# Patient Record
Sex: FEMALE | Race: OTHER | Employment: FULL TIME | URBAN - METROPOLITAN AREA
[De-identification: names, ages, dates, MRNs, and addresses within clinical notes are randomized per-mention and may not be internally consistent; named-entity substitution may affect disease eponyms.]

---

## 2022-06-03 ENCOUNTER — OFFICE VISIT (OUTPATIENT)
Dept: ORTHOPEDIC SURGERY | Age: 57
End: 2022-06-03
Payer: COMMERCIAL

## 2022-06-03 VITALS — HEIGHT: 65 IN | WEIGHT: 149 LBS | BODY MASS INDEX: 24.83 KG/M2

## 2022-06-03 DIAGNOSIS — G89.29 CHRONIC PAIN OF BOTH KNEES: Primary | ICD-10-CM

## 2022-06-03 DIAGNOSIS — M25.562 CHRONIC PAIN OF BOTH KNEES: Primary | ICD-10-CM

## 2022-06-03 DIAGNOSIS — M25.561 CHRONIC PAIN OF BOTH KNEES: Primary | ICD-10-CM

## 2022-06-03 DIAGNOSIS — M17.0 BILATERAL PRIMARY OSTEOARTHRITIS OF KNEE: ICD-10-CM

## 2022-06-03 PROCEDURE — 99213 OFFICE O/P EST LOW 20 MIN: CPT | Performed by: ORTHOPAEDIC SURGERY

## 2022-06-03 PROCEDURE — 20610 DRAIN/INJ JOINT/BURSA W/O US: CPT | Performed by: ORTHOPAEDIC SURGERY

## 2022-06-03 RX ORDER — CHOLECALCIFEROL (VITAMIN D3) 125 MCG
CAPSULE ORAL
COMMUNITY
Start: 2022-04-13

## 2022-06-03 RX ORDER — LIDOCAINE HYDROCHLORIDE 10 MG/ML
2 INJECTION INFILTRATION; PERINEURAL ONCE
Status: COMPLETED | OUTPATIENT
Start: 2022-06-03 | End: 2022-06-03

## 2022-06-03 RX ORDER — TRIAMCINOLONE ACETONIDE 40 MG/ML
40 INJECTION, SUSPENSION INTRA-ARTICULAR; INTRAMUSCULAR ONCE
Status: COMPLETED | OUTPATIENT
Start: 2022-06-03 | End: 2022-06-03

## 2022-06-03 RX ORDER — LORATADINE 10 MG/1
10 TABLET ORAL
COMMUNITY

## 2022-06-03 RX ORDER — AMMONIUM LACTATE 12 G/100G
LOTION TOPICAL
COMMUNITY
Start: 2022-05-19

## 2022-06-03 RX ORDER — BISMUTH SUBSALICYLATE 262 MG
1 TABLET,CHEWABLE ORAL DAILY
COMMUNITY

## 2022-06-03 RX ADMIN — TRIAMCINOLONE ACETONIDE 40 MG: 40 INJECTION, SUSPENSION INTRA-ARTICULAR; INTRAMUSCULAR at 16:53

## 2022-06-03 RX ADMIN — LIDOCAINE HYDROCHLORIDE 2 ML: 10 INJECTION INFILTRATION; PERINEURAL at 16:53

## 2022-06-03 NOTE — PROGRESS NOTES
Lindsey Hatch (: 1965) is a 60 y.o. female, patient, here for evaluation of the following chief complaint(s):  Knee Pain (bilateral knee discomfort, does not report any injury or fall, seen in [facility] for knee pain was recommended PT patient did not complete)       HPI:    Chief complaint bilateral knee pain. Longstanding history of knee pain. Discussed possible treatment options patient states that she is at this point not severely involved enough to consider surgery. No Known Allergies    Current Outpatient Medications   Medication Sig    cholecalciferol (VITAMIN D3) (5000 Units /125 mcg) capsule     ammonium lactate (LAC-HYDRIN) 12 % lotion     loratadine (Claritin) 10 mg tablet Take 10 mg by mouth daily as needed for Allergies.  multivitamin (ONE A DAY) tablet Take 1 Tablet by mouth daily.  cyanocobalamin, vitamin B-12, (VITAMIN B12 PO) Take  by mouth daily.  glucosamine sulfate (GLUCOSAMINE PO) Take  by mouth. No current facility-administered medications for this visit. History reviewed. No pertinent past medical history.      Past Surgical History:   Procedure Laterality Date    HX APPENDECTOMY      HX  SECTION         Family History   Problem Relation Age of Onset    Diabetes Mother     Hypertension Mother     Hypertension Father     Cancer Paternal Grandmother     Cancer Paternal Grandfather         Social History     Socioeconomic History    Marital status:      Spouse name: Not on file    Number of children: Not on file    Years of education: Not on file    Highest education level: Not on file   Occupational History    Not on file   Tobacco Use    Smoking status: Never Smoker    Smokeless tobacco: Never Used   Vaping Use    Vaping Use: Not on file   Substance and Sexual Activity    Alcohol use: Never    Drug use: Never    Sexual activity: Not on file   Other Topics Concern    Not on file   Social History Narrative    Not on file Social Determinants of Health     Financial Resource Strain:     Difficulty of Paying Living Expenses: Not on file   Food Insecurity:     Worried About Running Out of Food in the Last Year: Not on file    Radha of Food in the Last Year: Not on file   Transportation Needs:     Lack of Transportation (Medical): Not on file    Lack of Transportation (Non-Medical): Not on file   Physical Activity:     Days of Exercise per Week: Not on file    Minutes of Exercise per Session: Not on file   Stress:     Feeling of Stress : Not on file   Social Connections:     Frequency of Communication with Friends and Family: Not on file    Frequency of Social Gatherings with Friends and Family: Not on file    Attends [employer] Services: Not on file    Active Member of [address] [place] [address] [employer] or Organizations: Not on file    Attends Club or Organization Meetings: Not on file    Marital Status: Not on file   Intimate Partner Violence:     Fear of Current or Ex-Partner: Not on file    Emotionally Abused: Not on file    Physically Abused: Not on file    Sexually Abused: Not on file   Housing Stability:     Unable to Pay for Housing in the Last Year: Not on file    Number of [place] in the Last Year: Not on file    Unstable Housing in the Last Year: Not on file       ROS     Positive for: Musculoskeletal    Last edited by Robles Cuadra on 6/3/2022  2:47 PM. (History)            Vitals:  Ht 5' 5\" (1.651 m)   Wt 149 lb (67.6 kg)   BMI 24.79 kg/m²    Body mass index is 24.79 kg/m². PHYSICAL EXAM:  Bilateral knees are examined. Hips have painless range of motion. Both knees have mild effusions with medial joint line tenderness. Significant patellofemoral crepitation both knees. Right knee effusion is larger than the left. Marcy's testing is negative. IMAGING:  XR Results (most recent):  Results from Appointment encounter on 06/03/22    XR KNEES BI MIN 4 V    Narrative  Bilateral knee x-rays.   4 views in total. Right knee bone-on-bone medial compartment with calcified Baker's cyst and significant patellofemoral joint lateral facet narrowing. Left knee bone-on-bone patellofemoral joint with calcified Baker's cyst.  No acute issues are noted. ASSESSMENT/PLAN:  1. Chronic pain of both knees  -     XR KNEES BI MIN 4 V; Future  -     triamcinolone acetonide (KENALOG-40) 40 mg/mL injection 40 mg; 40 mg, Intra artICUlar, ONCE, 1 dose, On Fri 6/3/22 at 1700  -     lidocaine (XYLOCAINE) 10 mg/mL (1 %) injection 2 mL; 2 mL, Intra artICUlar, ONCE, 1 dose, On Fri 6/3/22 at 1700  -     triamcinolone acetonide (KENALOG-40) 40 mg/mL injection 40 mg; 40 mg, Intra artICUlar, ONCE, 1 dose, On Fri 6/3/22 at 1700  -     lidocaine (XYLOCAINE) 10 mg/mL (1 %) injection 2 mL; 2 mL, Intra artICUlar, ONCE, 1 dose, On Fri 6/3/22 at 1700  2. Bilateral primary osteoarthritis of knee  -     REFERRAL TO PHYSICAL THERAPY  -     triamcinolone acetonide (KENALOG-40) 40 mg/mL injection 40 mg; 40 mg, Intra artICUlar, ONCE, 1 dose, On Fri 6/3/22 at 1700  -     lidocaine (XYLOCAINE) 10 mg/mL (1 %) injection 2 mL; 2 mL, Intra artICUlar, ONCE, 1 dose, On Fri 6/3/22 at 1700  -     triamcinolone acetonide (KENALOG-40) 40 mg/mL injection 40 mg; 40 mg, Intra artICUlar, ONCE, 1 dose, On Fri 6/3/22 at 1700  -     lidocaine (XYLOCAINE) 10 mg/mL (1 %) injection 2 mL; 2 mL, Intra artICUlar, ONCE, 1 dose, On Fri 6/3/22 at 1700    Discussed risks/benefits of cortisone injection and patient gave verbal consent. Under sterile conditions, the bilateral knees were injected with  2cc 1% Lidocaine and 1 cc 40 mg/cc Kenalog intra-articularly, tolerated the procedure well. Therapy was prescribed. Continue with NSAIDs. Follow-up as her symptoms progress. An electronic signature was used to authenticate this note.   --Paula Torre MD

## 2022-10-06 ENCOUNTER — OFFICE VISIT (OUTPATIENT)
Dept: ORTHOPEDIC SURGERY | Age: 57
End: 2022-10-06
Payer: COMMERCIAL

## 2022-10-06 VITALS — BODY MASS INDEX: 24.83 KG/M2 | HEIGHT: 65 IN | WEIGHT: 149 LBS

## 2022-10-06 DIAGNOSIS — M25.461 EFFUSION OF RIGHT KNEE: ICD-10-CM

## 2022-10-06 DIAGNOSIS — M25.462 EFFUSION, LEFT KNEE: Primary | ICD-10-CM

## 2022-10-06 DIAGNOSIS — M17.0 BILATERAL PRIMARY OSTEOARTHRITIS OF KNEE: ICD-10-CM

## 2022-10-06 PROCEDURE — 20610 DRAIN/INJ JOINT/BURSA W/O US: CPT | Performed by: ORTHOPAEDIC SURGERY

## 2022-10-06 RX ORDER — LIDOCAINE HYDROCHLORIDE 10 MG/ML
2 INJECTION INFILTRATION; PERINEURAL ONCE
Status: COMPLETED | OUTPATIENT
Start: 2022-10-06 | End: 2022-10-06

## 2022-10-06 RX ORDER — TRIAMCINOLONE ACETONIDE 40 MG/ML
40 INJECTION, SUSPENSION INTRA-ARTICULAR; INTRAMUSCULAR ONCE
Status: COMPLETED | OUTPATIENT
Start: 2022-10-06 | End: 2022-10-06

## 2022-10-06 RX ADMIN — LIDOCAINE HYDROCHLORIDE 2 ML: 10 INJECTION INFILTRATION; PERINEURAL at 16:31

## 2022-10-06 RX ADMIN — TRIAMCINOLONE ACETONIDE 40 MG: 40 INJECTION, SUSPENSION INTRA-ARTICULAR; INTRAMUSCULAR at 16:32

## 2022-10-06 RX ADMIN — TRIAMCINOLONE ACETONIDE 40 MG: 40 INJECTION, SUSPENSION INTRA-ARTICULAR; INTRAMUSCULAR at 16:31

## 2022-10-06 NOTE — PROGRESS NOTES
Oz Gautam (: 1965) is a 59 y.o. female, patient, here for evaluation of the following chief complaint(s):  Knee Pain (Bilateral knee pain - last injected with cortisone in 2022)       HPI:    Patient has severe patellofemoral joint osteoarthritis both knees. The right knee is bone-on-bone medial compartment from prior x-rays. She was getting along quite well. Last injected in . She is now very painful and swollen. No injury. No other joints are bothering her. No Known Allergies    Current Outpatient Medications   Medication Sig    cholecalciferol (VITAMIN D3) (5000 Units /125 mcg) capsule     ammonium lactate (LAC-HYDRIN) 12 % lotion     loratadine (Claritin) 10 mg tablet Take 10 mg by mouth daily as needed for Allergies. multivitamin (ONE A DAY) tablet Take 1 Tablet by mouth daily. cyanocobalamin, vitamin B-12, (VITAMIN B12 PO) Take  by mouth daily. glucosamine sulfate (GLUCOSAMINE PO) Take  by mouth. No current facility-administered medications for this visit. History reviewed. No pertinent past medical history.      Past Surgical History:   Procedure Laterality Date    HX APPENDECTOMY      HX  SECTION         Family History   Problem Relation Age of Onset    Diabetes Mother     Hypertension Mother     Hypertension Father     Cancer Paternal Grandmother     Cancer Paternal Grandfather         Social History     Socioeconomic History    Marital status:      Spouse name: Not on file    Number of children: Not on file    Years of education: Not on file    Highest education level: Not on file   Occupational History    Not on file   Tobacco Use    Smoking status: Never    Smokeless tobacco: Never   Vaping Use    Vaping Use: Not on file   Substance and Sexual Activity    Alcohol use: Never    Drug use: Never    Sexual activity: Not on file   Other Topics Concern    Not on file   Social History Narrative    Not on file     Social Determinants of Health Financial Resource Strain: Not on file   Food Insecurity: Not on file   Transportation Needs: Not on file   Physical Activity: Not on file   Stress: Not on file   Social Connections: Not on file   Intimate Partner Violence: Not on file   Housing Stability: Not on file       ROS    Positive for: Musculoskeletal  Last edited by Justen Garcia on 10/6/2022  4:04 PM.            Vitals:  Ht 5' 5\" (1.651 m)   Wt 149 lb (67.6 kg)   BMI 24.79 kg/m²    Body mass index is 24.79 kg/m². PHYSICAL EXAM:  Bilateral knees show neutral to slight varus overall alignment. Right knee large effusion. Left knee no effusion. Hips are painless. No other joints seem to bother her. ASSESSMENT/PLAN:  1. Effusion, left knee  -     triamcinolone acetonide (KENALOG-40) 40 mg/mL injection 40 mg; 40 mg, Intra artICUlar, ONCE, 1 dose, On Thu 10/6/22 at 1700  -     lidocaine (XYLOCAINE) 10 mg/mL (1 %) injection 2 mL; 2 mL, Intra artICUlar, ONCE, 1 dose, On Thu 10/6/22 at 1700  -     triamcinolone acetonide (KENALOG-40) 40 mg/mL injection 40 mg; 40 mg, Intra artICUlar, ONCE, 1 dose, On Thu 10/6/22 at 1700  -     lidocaine (XYLOCAINE) 10 mg/mL (1 %) injection 2 mL; 2 mL, Intra artICUlar, ONCE, 1 dose, On Thu 10/6/22 at 1700  2. Effusion of right knee  -     triamcinolone acetonide (KENALOG-40) 40 mg/mL injection 40 mg; 40 mg, Intra artICUlar, ONCE, 1 dose, On Thu 10/6/22 at 1700  -     lidocaine (XYLOCAINE) 10 mg/mL (1 %) injection 2 mL; 2 mL, Intra artICUlar, ONCE, 1 dose, On Thu 10/6/22 at 1700  -     triamcinolone acetonide (KENALOG-40) 40 mg/mL injection 40 mg; 40 mg, Intra artICUlar, ONCE, 1 dose, On Thu 10/6/22 at 1700  -     lidocaine (XYLOCAINE) 10 mg/mL (1 %) injection 2 mL; 2 mL, Intra artICUlar, ONCE, 1 dose, On Thu 10/6/22 at 1700  3.  Bilateral primary osteoarthritis of knee  -     triamcinolone acetonide (KENALOG-40) 40 mg/mL injection 40 mg; 40 mg, Intra artICUlar, ONCE, 1 dose, On Thu 10/6/22 at 1700  -     lidocaine (XYLOCAINE) 10 mg/mL (1 %) injection 2 mL; 2 mL, Intra artICUlar, ONCE, 1 dose, On Thu 10/6/22 at 1700  -     triamcinolone acetonide (KENALOG-40) 40 mg/mL injection 40 mg; 40 mg, Intra artICUlar, ONCE, 1 dose, On Thu 10/6/22 at 1700  -     lidocaine (XYLOCAINE) 10 mg/mL (1 %) injection 2 mL; 2 mL, Intra artICUlar, ONCE, 1 dose, On Thu 10/6/22 at 1700  Probably just flare of osteoarthritis. No other joints bother her. Doubt an inflammatory type of arthritis. Will treat symptoms. Aspirated both knees and injected both knees. Discussed risks/benefits of cortisone injection and patient gave verbal consent. Under sterile conditions, the bilateral knees were  injected with  2cc 1% Lidocaine and 1 cc 40 mg/cc Kenalog intra-articularly, tolerated the procedure well. An electronic signature was used to authenticate this note.   --Oren Finnegan MD